# Patient Record
Sex: MALE
[De-identification: names, ages, dates, MRNs, and addresses within clinical notes are randomized per-mention and may not be internally consistent; named-entity substitution may affect disease eponyms.]

---

## 2017-02-24 PROBLEM — Z00.00 ENCOUNTER FOR PREVENTIVE HEALTH EXAMINATION: Status: ACTIVE | Noted: 2017-02-24

## 2017-02-27 ENCOUNTER — APPOINTMENT (OUTPATIENT)
Dept: NEUROLOGY | Facility: CLINIC | Age: 72
End: 2017-02-27

## 2017-04-04 ENCOUNTER — APPOINTMENT (OUTPATIENT)
Dept: NEUROLOGY | Facility: CLINIC | Age: 72
End: 2017-04-04

## 2017-04-04 VITALS — DIASTOLIC BLOOD PRESSURE: 92 MMHG | SYSTOLIC BLOOD PRESSURE: 176 MMHG | HEART RATE: 101 BPM

## 2017-05-08 ENCOUNTER — APPOINTMENT (OUTPATIENT)
Dept: NEUROLOGY | Facility: CLINIC | Age: 72
End: 2017-05-08

## 2017-08-01 ENCOUNTER — APPOINTMENT (OUTPATIENT)
Dept: NEUROLOGY | Facility: CLINIC | Age: 72
End: 2017-08-01
Payer: MEDICARE

## 2017-08-01 VITALS — SYSTOLIC BLOOD PRESSURE: 164 MMHG | DIASTOLIC BLOOD PRESSURE: 83 MMHG | WEIGHT: 147 LBS | HEART RATE: 98 BPM

## 2017-08-01 VITALS — BODY MASS INDEX: 24.46 KG/M2 | HEIGHT: 65 IN

## 2017-08-01 PROCEDURE — 99214 OFFICE O/P EST MOD 30 MIN: CPT

## 2017-09-04 ENCOUNTER — TRANSCRIPTION ENCOUNTER (OUTPATIENT)
Age: 72
End: 2017-09-04

## 2017-11-29 ENCOUNTER — APPOINTMENT (OUTPATIENT)
Dept: NEUROLOGY | Facility: CLINIC | Age: 72
End: 2017-11-29
Payer: MEDICARE

## 2017-11-29 DIAGNOSIS — G47.00 INSOMNIA, UNSPECIFIED: ICD-10-CM

## 2017-11-29 PROCEDURE — 99214 OFFICE O/P EST MOD 30 MIN: CPT

## 2017-11-29 RX ORDER — PIRFENIDONE 267 MG/1
CAPSULE ORAL
Refills: 0 | Status: ACTIVE | COMMUNITY

## 2018-01-17 ENCOUNTER — APPOINTMENT (OUTPATIENT)
Dept: NEUROLOGY | Facility: CLINIC | Age: 73
End: 2018-01-17
Payer: MEDICARE

## 2018-01-17 PROCEDURE — 99214 OFFICE O/P EST MOD 30 MIN: CPT

## 2018-01-17 RX ORDER — MIRTAZAPINE 7.5 MG/1
7.5 TABLET, FILM COATED ORAL
Qty: 60 | Refills: 5 | Status: DISCONTINUED | COMMUNITY
Start: 2017-11-29 | End: 2018-01-17

## 2018-06-15 ENCOUNTER — APPOINTMENT (OUTPATIENT)
Dept: NEUROLOGY | Facility: CLINIC | Age: 73
End: 2018-06-15

## 2018-07-20 ENCOUNTER — APPOINTMENT (OUTPATIENT)
Dept: NEUROLOGY | Facility: CLINIC | Age: 73
End: 2018-07-20
Payer: MEDICARE

## 2018-07-20 PROCEDURE — 99214 OFFICE O/P EST MOD 30 MIN: CPT

## 2018-07-25 ENCOUNTER — MEDICATION RENEWAL (OUTPATIENT)
Age: 73
End: 2018-07-25

## 2018-12-19 ENCOUNTER — APPOINTMENT (OUTPATIENT)
Dept: NEUROLOGY | Facility: CLINIC | Age: 73
End: 2018-12-19
Payer: MEDICARE

## 2018-12-19 DIAGNOSIS — G31.84 MILD COGNITIVE IMPAIRMENT, SO STATED: ICD-10-CM

## 2018-12-19 PROCEDURE — 99214 OFFICE O/P EST MOD 30 MIN: CPT

## 2018-12-19 RX ORDER — BENZONATATE 100 MG/1
100 CAPSULE ORAL
Qty: 60 | Refills: 0 | Status: ACTIVE | COMMUNITY
Start: 2018-07-06

## 2018-12-19 RX ORDER — FESOTERODINE FUMARATE 4 MG/1
4 TABLET, FILM COATED, EXTENDED RELEASE ORAL
Qty: 30 | Refills: 0 | Status: ACTIVE | COMMUNITY
Start: 2018-08-24

## 2018-12-19 RX ORDER — ALBUTEROL SULFATE 90 UG/1
108 (90 BASE) AEROSOL, METERED RESPIRATORY (INHALATION)
Qty: 18 | Refills: 0 | Status: ACTIVE | COMMUNITY
Start: 2018-10-19

## 2018-12-19 RX ORDER — BRIMONIDINE TARTRATE 1 MG/ML
0.1 SOLUTION/ DROPS OPHTHALMIC
Qty: 5 | Refills: 0 | Status: ACTIVE | COMMUNITY
Start: 2018-06-06

## 2018-12-19 RX ORDER — OMEPRAZOLE 40 MG/1
40 CAPSULE, DELAYED RELEASE ORAL
Qty: 90 | Refills: 0 | Status: ACTIVE | COMMUNITY
Start: 2018-06-29

## 2018-12-19 RX ORDER — ERGOCALCIFEROL 1.25 MG/1
1.25 MG CAPSULE, LIQUID FILLED ORAL
Qty: 4 | Refills: 0 | Status: ACTIVE | COMMUNITY
Start: 2018-08-23

## 2018-12-19 RX ORDER — LEVOFLOXACIN 500 MG/1
500 TABLET, FILM COATED ORAL
Qty: 10 | Refills: 0 | Status: ACTIVE | COMMUNITY
Start: 2018-08-14

## 2018-12-19 RX ORDER — LOSARTAN POTASSIUM 50 MG/1
50 TABLET, FILM COATED ORAL
Qty: 90 | Refills: 0 | Status: ACTIVE | COMMUNITY
Start: 2018-06-29

## 2018-12-19 RX ORDER — LINAGLIPTIN 5 MG/1
5 TABLET, FILM COATED ORAL
Qty: 30 | Refills: 0 | Status: ACTIVE | COMMUNITY
Start: 2018-07-03

## 2018-12-19 RX ORDER — ATORVASTATIN CALCIUM 10 MG/1
10 TABLET, FILM COATED ORAL
Qty: 90 | Refills: 0 | Status: ACTIVE | COMMUNITY
Start: 2018-09-03

## 2018-12-19 RX ORDER — BUDESONIDE 0.5 MG/2ML
0.5 INHALANT ORAL
Qty: 120 | Refills: 0 | Status: ACTIVE | COMMUNITY
Start: 2018-10-19

## 2018-12-19 RX ORDER — DORZOLAMIDE HYDROCHLORIDE TIMOLOL MALEATE 20; 5 MG/ML; MG/ML
22.3-6.8 SOLUTION/ DROPS OPHTHALMIC
Qty: 10 | Refills: 0 | Status: ACTIVE | COMMUNITY
Start: 2018-06-06

## 2018-12-19 RX ORDER — METFORMIN HYDROCHLORIDE 1000 MG/1
1000 TABLET, COATED ORAL
Qty: 180 | Refills: 0 | Status: ACTIVE | COMMUNITY
Start: 2018-10-19

## 2018-12-19 RX ORDER — IPRATROPIUM BROMIDE 0.5 MG/2.5ML
0.02 SOLUTION RESPIRATORY (INHALATION)
Qty: 125 | Refills: 0 | Status: ACTIVE | COMMUNITY
Start: 2018-10-19

## 2018-12-19 RX ORDER — TAMSULOSIN HYDROCHLORIDE 0.4 MG/1
0.4 CAPSULE ORAL
Qty: 90 | Refills: 0 | Status: ACTIVE | COMMUNITY
Start: 2018-08-23

## 2018-12-19 RX ORDER — HYDROCHLOROTHIAZIDE 12.5 MG/1
12.5 CAPSULE ORAL
Qty: 90 | Refills: 0 | Status: ACTIVE | COMMUNITY
Start: 2018-10-19

## 2018-12-19 RX ORDER — ALBUTEROL SULFATE 2.5 MG/3ML
(2.5 MG/3ML) SOLUTION RESPIRATORY (INHALATION)
Qty: 300 | Refills: 0 | Status: ACTIVE | COMMUNITY
Start: 2018-09-14

## 2018-12-26 ENCOUNTER — OTHER (OUTPATIENT)
Age: 73
End: 2018-12-26

## 2019-01-29 ENCOUNTER — APPOINTMENT (OUTPATIENT)
Dept: NEUROLOGY | Facility: CLINIC | Age: 74
End: 2019-01-29
Payer: MEDICARE

## 2019-01-29 DIAGNOSIS — G20 PARKINSON'S DISEASE: ICD-10-CM

## 2019-01-29 PROCEDURE — 99214 OFFICE O/P EST MOD 30 MIN: CPT

## 2019-01-29 NOTE — HISTORY OF PRESENT ILLNESS
[FreeTextEntry1] : Patient is a 73 year old right handed man who comes for parkinsonism, started with difficulty walking since this year. 2016, he was found in the bathtub without pulse and agonizing breathing. They had to give him CPR and later transferred to hospital. No head trauma. EEG was done. Antibiotic was given for one week. Holter monitoring is being followed by Cardiologist. \par No acting out of his dreams. Home PT since hospitalization. When he eats, he wants to do it faster than usual and he chokes. Decrease in speech. Speech therapy is pending to perform an evaluation. No constipation. Walking is more difficulty, slower and weaker. Difficulty with distance. No falls. No changes in memory. \par \par 1. Since last visit, less falls, more dysphagia\par 2. Currently taking Sinemet 25/100, now at  2 tab TID, having trouble at times with dressing buttons. No hallucinations, but perhaps more confused. No dyskinesias. Speech is lowered.\par 3. Sleep is ok on trazodone at night 50 mg(did not tolerate 100 mg). Mood no better. \par 4. MRI unremarkable, cardiac monitoring unremarkable\par 5. Memory is getting worse, on exelon 1.5 mg bid

## 2019-01-29 NOTE — DISCUSSION/SUMMARY
[FreeTextEntry1] : In summary, Mr. Rowley is a 73 year old right handed man who started with difficulty walking since about 2016. I am not sure if this is PD versus APD (PSP?), seems a little better on higher sinemet, but also perhaps more confused.\par \par  Recommendations:\par 1. PT\par 2. Continue trazodone at 50 mg, add melatonin\par 3. Continue exelon, increase to 3 mg bid\par 4. Continue Sinemet 25/100 2 tab TID\par

## 2019-01-29 NOTE — PHYSICAL EXAM
[General Appearance - Alert] : alert [General Appearance - In No Acute Distress] : in no acute distress [General Appearance - Well Developed] : well developed [Oriented To Time, Place, And Person] : oriented to person, place, and time [Impaired Insight] : insight and judgment were intact [Mood] : the mood was normal [Person] : oriented to person [Place] : oriented to place [Time] : oriented to time [Registration Intact] : recent registration memory intact [Naming Objects] : no difficulty naming common objects [Fluency] : fluency intact [Cranial Nerves Optic (II)] : visual acuity intact bilaterally,  visual fields full to confrontation, pupils equal round and reactive to light [Cranial Nerves Oculomotor (III)] : extraocular motion intact [Cranial Nerves Trigeminal (V)] : facial sensation intact symmetrically [Cranial Nerves Facial (VII)] : face symmetrical [Cranial Nerves Vestibulocochlear (VIII)] : hearing was intact bilaterally [Cranial Nerves Glossopharyngeal (IX)] : tongue and palate midline [Cranial Nerves Accessory (XI - Cranial And Spinal)] : head turning and shoulder shrug symmetric [Cranial Nerves Hypoglossal (XII)] : there was no tongue deviation with protrusion [Motor Handedness Right-Handed] : the patient is right hand dominant [Short Term Intact] : short term memory impaired [FreeTextEntry4] : Date = February 2019 (with some thought). Sean Tuesday or Wednesday. 1/3 in delayed recall [FreeTextEntry1] : Facial expression is decreased as well as blinking rate. Extraocular movements were slowed with no square waves jerk seen (down gaze in particular). No resting tremor noted today. No increase in tone in neck. No rigidity in upper extremities. Bradykinesia in finger/toe taps and heel stomps in right side,grade 1. Able to stand with arms. Gait with no stooped posture, improved stride, decrease arm swing on the right. Pull test was positive

## 2019-01-29 NOTE — REVIEW OF SYSTEMS
[Fever] : fever [Sleep Disturbances] : sleep disturbances [As Noted in HPI] : as noted in HPI [Negative] : Heme/Lymph [Eye Pain] : no eye pain [Earache] : no earache [Palpitations] : no palpitations [Shortness Of Breath] : no shortness of breath [Constipation] : no constipation [Incontinence] : no incontinence [FreeTextEntry2] : January 1, 2018

## 2019-05-21 ENCOUNTER — APPOINTMENT (OUTPATIENT)
Dept: NEUROLOGY | Facility: CLINIC | Age: 74
End: 2019-05-21

## 2019-07-17 ENCOUNTER — APPOINTMENT (OUTPATIENT)
Dept: NEUROLOGY | Facility: CLINIC | Age: 74
End: 2019-07-17
Payer: MEDICARE

## 2019-07-17 VITALS
HEIGHT: 65 IN | DIASTOLIC BLOOD PRESSURE: 75 MMHG | SYSTOLIC BLOOD PRESSURE: 134 MMHG | BODY MASS INDEX: 21.33 KG/M2 | WEIGHT: 128 LBS | HEART RATE: 112 BPM

## 2019-07-17 DIAGNOSIS — G20 PARKINSON'S DISEASE: ICD-10-CM

## 2019-07-17 DIAGNOSIS — F41.8 OTHER SPECIFIED ANXIETY DISORDERS: ICD-10-CM

## 2019-07-17 PROCEDURE — 99214 OFFICE O/P EST MOD 30 MIN: CPT

## 2019-07-17 RX ORDER — TRAZODONE HYDROCHLORIDE 50 MG/1
50 TABLET ORAL
Qty: 30 | Refills: 0 | Status: ACTIVE | COMMUNITY
Start: 2018-10-19

## 2019-07-17 RX ORDER — CARBIDOPA AND LEVODOPA 25; 100 MG/1; MG/1
25-100 TABLET ORAL 3 TIMES DAILY
Qty: 180 | Refills: 5 | Status: ACTIVE | COMMUNITY
Start: 2017-04-04 | End: 1900-01-01

## 2019-07-17 RX ORDER — SERTRALINE HYDROCHLORIDE 50 MG/1
50 TABLET, FILM COATED ORAL DAILY
Qty: 30 | Refills: 2 | Status: ACTIVE | COMMUNITY
Start: 2019-07-17 | End: 1900-01-01

## 2019-07-17 RX ORDER — TRAZODONE HYDROCHLORIDE 100 MG/1
100 TABLET ORAL
Qty: 30 | Refills: 5 | Status: DISCONTINUED | COMMUNITY
Start: 2018-01-17 | End: 2019-07-17

## 2019-07-17 RX ORDER — RIVASTIGMINE TARTRATE 3 MG/1
3 CAPSULE ORAL TWICE DAILY
Qty: 60 | Refills: 5 | Status: ACTIVE | COMMUNITY
Start: 2018-07-20 | End: 1900-01-01

## 2019-07-17 NOTE — HISTORY OF PRESENT ILLNESS
[FreeTextEntry1] : Patient is a 73 year old right handed man who comes for parkinsonism, which started with difficulty walking since 2016. The patient is here with his daughter and wife. He recently moved to [place].\par \par In 2016, he was found in the bathtub without pulse and agonizing breathing. They had to give him CPR and later transferred to hospital. No head trauma. EEG was done. Antibiotic was given for one week. Holter monitoring was negative.\par \par Can no longer dress himself. Sometimes cannot pick himself up and needs to be lifted.\par The family denies any difficulty sleeping or acting out of dreams, no talking in sleep.\par The last time he received PT was a year ago.\par He does not speak much anymore, and typically points at things he wants. \par No constipation or urinary difficulty. \par Someone always has to be with him. \par He started drooling last month.\par \par 1. Walking is more difficulty, slower and weaker, also more distracted, looking around instead of where he is going. When he starts walking, he keeps going faster. Falls have increased, last fall was last week. Falling backwards.\par 2. Currently taking Sinemet 25/100, now at  2 tab TID. No particularly No hallucinations, but perhaps more confused. No dyskinesias.  He went as high as 2.5 tabs TID, but experienced confusion. Unclear if he had dyskinesias based on family's history.\par 3. Sleep is okay on trazodone at night 50 mg (did not tolerate 100 mg). \par 4. When he eats, he wants to do it faster than usual and he chokes, but swallowing has become a worsening problem with each meal, which has led to weight loss. \par 5. Memory is getting worse, on Exelon 3 mg bid. Mood is worse, appears to be apathetic. He has no motivations to get out of bed and do anything. He is not interacting as much and gets frustrated, particularly with his wife.\par 6. He has been having a lot of movements including clapping, shaking his hands, shaking his legs, hitting himself on the stomach and on the wall behind his head\par

## 2019-07-17 NOTE — PHYSICAL EXAM
[General Appearance - Alert] : alert [General Appearance - In No Acute Distress] : in no acute distress [General Appearance - Well Developed] : well developed [Person] : oriented to person [Registration Intact] : recent registration memory intact [Naming Objects] : no difficulty naming common objects [Fluency] : fluency intact [Cranial Nerves Optic (II)] : visual acuity intact bilaterally,  visual fields full to confrontation, pupils equal round and reactive to light [Cranial Nerves Oculomotor (III)] : extraocular motion intact [Cranial Nerves Trigeminal (V)] : facial sensation intact symmetrically [Cranial Nerves Facial (VII)] : face symmetrical [Cranial Nerves Vestibulocochlear (VIII)] : hearing was intact bilaterally [Cranial Nerves Glossopharyngeal (IX)] : tongue and palate midline [Cranial Nerves Accessory (XI - Cranial And Spinal)] : head turning and shoulder shrug symmetric [Cranial Nerves Hypoglossal (XII)] : there was no tongue deviation with protrusion [Motor Handedness Right-Handed] : the patient is right hand dominant [Oriented to Person] : oriented to person [Short Term Intact] : short term memory impaired [Concentration Intact] : a decrease in concentrating ability was observed [Visual Intact] : visual attention was ~T ~L decreased [FreeTextEntry1] : Facial expression is decreased as well as blinking rate. There is a wide stare. Extraocular movements were impaired with no square waves jerk seen (both upward and downward gaze, as well as horizontal eye movements). Speech quality is not growling. Mild resting tremor of the R thumb. No increase in tone in neck. No rigidity in upper extremities. Bradykinesia in finger/toe taps and heel stomps bilaterally, R > L. Able to stand with arms. Gait with no stooped posture, good stride, decrease arm swing on the right. Patient does not focus on his walking and looks around instead of forward, gets distracted and stops, and sways to either side, but more so the R. Pull test was negative today.\par

## 2019-07-17 NOTE — DISCUSSION/SUMMARY
[FreeTextEntry1] : In summary, Mr. Rowley is a 73 year old right handed man who started with difficulty walking since about 2016. Previously, thought that the patient was doing better on a higher dose of Sinemet, but may have been getting a little more confused. 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 \par \par Recommendations:\par 1. Speech & Swallow for swallow eval\par 2. Continue trazodone at 50 mg \par 3. Continue Exelon 3 mg bid\par 4. Continue Sinemet 25/100 2 tab TID\par 5. Start Zoloft 50mg daily to try and address patient's lack of motivation\par \par RTC in 4 months

## 2020-01-22 ENCOUNTER — APPOINTMENT (OUTPATIENT)
Dept: NEUROLOGY | Facility: CLINIC | Age: 75
End: 2020-01-22